# Patient Record
Sex: FEMALE | Race: ASIAN | NOT HISPANIC OR LATINO | Employment: STUDENT | URBAN - METROPOLITAN AREA
[De-identification: names, ages, dates, MRNs, and addresses within clinical notes are randomized per-mention and may not be internally consistent; named-entity substitution may affect disease eponyms.]

---

## 2025-01-29 ENCOUNTER — INITIAL PRENATAL (OUTPATIENT)
Dept: OBSTETRICS AND GYNECOLOGY | Facility: CLINIC | Age: 35
End: 2025-01-29
Payer: COMMERCIAL

## 2025-01-29 VITALS
WEIGHT: 157 LBS | HEIGHT: 63 IN | BODY MASS INDEX: 27.82 KG/M2 | DIASTOLIC BLOOD PRESSURE: 79 MMHG | SYSTOLIC BLOOD PRESSURE: 124 MMHG

## 2025-01-29 DIAGNOSIS — O26.891 PREGNANCY HEADACHE IN FIRST TRIMESTER (HHS-HCC): Primary | ICD-10-CM

## 2025-01-29 DIAGNOSIS — R51.9 PREGNANCY HEADACHE IN FIRST TRIMESTER (HHS-HCC): Primary | ICD-10-CM

## 2025-01-29 DIAGNOSIS — Z34.91 PRENATAL CARE IN FIRST TRIMESTER (HHS-HCC): ICD-10-CM

## 2025-01-29 DIAGNOSIS — O09.299 HISTORY OF MATERNAL FOURTH DEGREE PERINEAL LACERATION, CURRENTLY PREGNANT (HHS-HCC): ICD-10-CM

## 2025-01-29 DIAGNOSIS — O09.521 MULTIGRAVIDA OF ADVANCED MATERNAL AGE IN FIRST TRIMESTER (HHS-HCC): ICD-10-CM

## 2025-01-29 PROCEDURE — 0500F INITIAL PRENATAL CARE VISIT: CPT | Performed by: OBSTETRICS & GYNECOLOGY

## 2025-01-29 RX ORDER — METOCLOPRAMIDE 10 MG/1
10 TABLET ORAL EVERY 6 HOURS PRN
Qty: 1 TABLET | Refills: 2 | Status: SHIPPED | OUTPATIENT
Start: 2025-01-29 | End: 2025-03-30

## 2025-01-29 ASSESSMENT — EDINBURGH POSTNATAL DEPRESSION SCALE (EPDS)
I HAVE FELT SAD OR MISERABLE: NOT VERY OFTEN
I HAVE LOOKED FORWARD WITH ENJOYMENT TO THINGS: AS MUCH AS I EVER DID
I HAVE FELT SCARED OR PANICKY FOR NO GOOD REASON: NO, NOT MUCH
I HAVE BLAMED MYSELF UNNECESSARILY WHEN THINGS WENT WRONG: NOT VERY OFTEN
THINGS HAVE BEEN GETTING ON TOP OF ME: NO, MOST OF THE TIME I HAVE COPED QUITE WELL
TOTAL SCORE: 9
I HAVE BEEN SO UNHAPPY THAT I HAVE HAD DIFFICULTY SLEEPING: NOT VERY OFTEN
THE THOUGHT OF HARMING MYSELF HAS OCCURRED TO ME: HARDLY EVER
I HAVE BEEN ANXIOUS OR WORRIED FOR NO GOOD REASON: YES, SOMETIMES
I HAVE BEEN SO UNHAPPY THAT I HAVE BEEN CRYING: ONLY OCCASIONALLY
I HAVE BEEN ABLE TO LAUGH AND SEE THE FUNNY SIDE OF THINGS: AS MUCH AS I ALWAYS COULD

## 2025-01-29 NOTE — PROGRESS NOTES
Subjective   Patient ID [id number]   Gloria Browne is a 34 y.o.  at 4w6d with a working estimated date of delivery of 10/2/2025, by Last Menstrual Period who presents for an initial prenatal visit. No bleeding or pain, Has h/o headaches takes aleve wants to know what she can use in pregnancy.  No bleeding or pain.  Has had flu vaccine and updated covid vaccine.    Her pregnancy is notable for:  AMA  Prior VAVD, 4th degree  Vegetarian      OB History    Para Term  AB Living   2 1 1     1   SAB IAB Ectopic Multiple Live Births           1      # Outcome Date GA Lbr Misael/2nd Weight Sex Type Anes PTL Lv   2 Current            1 Term 21 39w0d  2.948 kg F Vag-Vacuum   PHYLICIA     [place]  Depression Scale Total: 9    Objective   Physical Exam  Weight: 71.2 kg (157 lb)  Expected Total Weight Gain: 7 kg (15 lb)-11.5 kg (25 lb)   Pregravid BMI: 27.82  BP: 124/79          OBGyn Exam    Assessment/Plan     lGoria Browne is a 34 y.o.  at 4w6d with a working estimated date of delivery of 10/2/2025, by Last Menstrual Period who presents for an initial prenatal visit.     Gestational sac 4+ week noted in uterus on TVUS.  Recommended daily magnesium, will call in reglan for headaches prn.  Pt states tylenol does not help.  Ok to take unisom for sleep/nausea.  Will start ASA next visit.  Has urinary incontinence, did try pelvic floor therapy, discussed surgical management would wait until after delivery.      Problem List Items Addressed This Visit    None  Visit Diagnoses       Prenatal care in first trimester ([facility])        Relevant Orders    C. trachomatis / N. gonorrhoeae, Amplified, Urogenital    Urine Culture    US MAC OB imaging order            RTC in 2 weeks viability scan.  Folder given  Sary Robert MD

## 2025-02-01 LAB
BACTERIA UR CULT: NORMAL
C TRACH RRNA SPEC QL NAA+PROBE: NOT DETECTED
N GONORRHOEA RRNA SPEC QL NAA+PROBE: NOT DETECTED
QUEST GC CT AMPLIFIED (ALWAYS MESSAGE): NORMAL

## 2025-02-12 ENCOUNTER — LAB (OUTPATIENT)
Dept: LAB | Facility: HOSPITAL | Age: 35
End: 2025-02-12
Payer: COMMERCIAL

## 2025-02-12 ENCOUNTER — APPOINTMENT (OUTPATIENT)
Dept: OBSTETRICS AND GYNECOLOGY | Facility: CLINIC | Age: 35
End: 2025-02-12
Payer: COMMERCIAL

## 2025-02-12 VITALS — WEIGHT: 157.5 LBS | DIASTOLIC BLOOD PRESSURE: 80 MMHG | BODY MASS INDEX: 27.9 KG/M2 | SYSTOLIC BLOOD PRESSURE: 134 MMHG

## 2025-02-12 DIAGNOSIS — O09.91 SUPERVISION OF HIGH RISK PREGNANCY, UNSPECIFIED, FIRST TRIMESTER (HHS-HCC): Primary | ICD-10-CM

## 2025-02-12 DIAGNOSIS — O26.21 RECURRENT PREGNANCY LOSS IN PREGNANT PATIENT IN FIRST TRIMESTER, ANTEPARTUM (HHS-HCC): ICD-10-CM

## 2025-02-12 DIAGNOSIS — O26.891 PREGNANCY HEADACHE IN FIRST TRIMESTER (HHS-HCC): ICD-10-CM

## 2025-02-12 DIAGNOSIS — R51.9 PREGNANCY HEADACHE IN FIRST TRIMESTER (HHS-HCC): ICD-10-CM

## 2025-02-12 PROBLEM — Z3A.01 6 WEEKS GESTATION OF PREGNANCY (HHS-HCC): Status: ACTIVE | Noted: 2025-02-12

## 2025-02-12 LAB
ABO GROUP (TYPE) IN BLOOD: NORMAL
ANTIBODY SCREEN: NORMAL
ERYTHROCYTE [DISTWIDTH] IN BLOOD BY AUTOMATED COUNT: 12.6 % (ref 11.5–14.5)
FERRITIN SERPL-MCNC: 87 NG/ML
HCT VFR BLD AUTO: 38.7 % (ref 36–46)
HGB BLD-MCNC: 13.1 G/DL (ref 12–16)
MCH RBC QN AUTO: 27.8 PG (ref 26–34)
MCHC RBC AUTO-ENTMCNC: 33.9 G/DL (ref 32–36)
MCV RBC AUTO: 82 FL (ref 80–100)
NRBC BLD-RTO: 0 /100 WBCS (ref 0–0)
PLATELET # BLD AUTO: 302 X10*3/UL (ref 150–450)
RBC # BLD AUTO: 4.72 X10*6/UL (ref 4–5.2)
RH FACTOR (ANTIGEN D): NORMAL
WBC # BLD AUTO: 10 X10*3/UL (ref 4.4–11.3)

## 2025-02-12 PROCEDURE — 83020 HEMOGLOBIN ELECTROPHORESIS: CPT

## 2025-02-12 PROCEDURE — 83021 HEMOGLOBIN CHROMOTOGRAPHY: CPT

## 2025-02-12 PROCEDURE — 86901 BLOOD TYPING SEROLOGIC RH(D): CPT

## 2025-02-12 PROCEDURE — 86900 BLOOD TYPING SEROLOGIC ABO: CPT

## 2025-02-12 PROCEDURE — 85027 COMPLETE CBC AUTOMATED: CPT

## 2025-02-12 PROCEDURE — 0501F PRENATAL FLOW SHEET: CPT | Performed by: OBSTETRICS & GYNECOLOGY

## 2025-02-12 PROCEDURE — 86850 RBC ANTIBODY SCREEN: CPT

## 2025-02-12 PROCEDURE — 82728 ASSAY OF FERRITIN: CPT

## 2025-02-12 NOTE — PROGRESS NOTES
Ob Visit  25     SUBJECTIVE      HPI: Gloria Browne is a 34 y.o.  at 6w6d here for RPNV.  No bleeding or pain.  Feels very fatigued.  Also has been having headaches not relieved with tylenol, reglan, bendaryl, magnesium.  Had this prior to pregnancy and used naprosyn which she now she cannot take it pregnancy.  Has never had neuro workup     OBJECTIVE  Visit Vitals  /80   Wt 71.4 kg (157 lb 8 oz)   LMP 2024   BMI 27.90 kg/m²   OB Status Pregnant   Smoking Status Never   BSA 1.78 m²            ASSESSMENT & PLAN    Gloria Browne is a 34 y.o.  at 6w6d here for the following concerns we addressed today:    Problem List Items Addressed This Visit       Pregnancy headache in first trimester ([facility]HS-HCC)    Relevant Orders    Referral to Neurology    Referral to [facility]     Other Visit Diagnoses       Supervision of high risk pregnancy, unspecified, first trimester ([facility]-HCC)    -  Primary    Relevant Orders    CBC Anemia Panel With Reflex,Pregnancy    Ferritin, Pregnancy (Completed)    Hemoglobin A1C    Hemoglobin Identification with Path Review    Hepatitis B Surface Antigen    Hepatitis C Antibody    HIV 1/2 Antigen/Antibody Screen with Reflex to Confirmation    Rubella Antibody, IgG    Syphilis Screen with Reflex    TSH with reflex to Free T4 if abnormal    Vitamin D 25-Hydroxy,Total (for eval of Vitamin D levels)    Type And Screen Is this order related to pregnancy or an upcoming surgery? Yes; [place] [facility] [place] the date of the surgery? 3/10/2025; Has this patient ever had a transfusion? No; Has th...    Recurrent pregnancy loss in pregnant patient in first trimester, antepartum ([facility]HS-HCC)                  RTC in 3 weeks      Sary Robert MD

## 2025-02-13 LAB
HEMOGLOBIN A2: 3 % (ref 2–3.5)
HEMOGLOBIN A: 96.6 % (ref 95.8–98)
HEMOGLOBIN F: 0.4 % (ref 0–2)
HEMOGLOBIN IDENTIFICATION INTERPRETATION: NORMAL
PATH REVIEW-HGB IDENTIFICATION: NORMAL
REFLEX ADDED, ANEMIA PANEL: NORMAL

## 2025-02-14 LAB
25(OH)D3+25(OH)D2 SERPL-MCNC: 23 NG/ML (ref 30–100)
EST. AVERAGE GLUCOSE BLD GHB EST-MCNC: 114 MG/DL
EST. AVERAGE GLUCOSE BLD GHB EST-SCNC: 6.3 MMOL/L
HBA1C MFR BLD: 5.6 % OF TOTAL HGB
HBV SURFACE AG SERPL QL IA: NORMAL
HCV AB SERPL QL IA: NORMAL
HIV 1+2 AB+HIV1 P24 AG SERPL QL IA: NORMAL
RUBV IGG SERPL IA-ACNC: 5.68 INDEX
T PALLIDUM AB SER QL IA: NORMAL
TSH SERPL-ACNC: 2.14 MIU/L

## 2025-02-17 LAB
25(OH)D3+25(OH)D2 SERPL-MCNC: 23 NG/ML (ref 30–100)
EST. AVERAGE GLUCOSE BLD GHB EST-MCNC: 114 MG/DL
EST. AVERAGE GLUCOSE BLD GHB EST-SCNC: 6.3 MMOL/L
HBA1C MFR BLD: 5.6 % OF TOTAL HGB
HBV SURFACE AG SERPL QL IA: NORMAL
HCV AB SERPL QL IA: NORMAL
HIV 1+2 AB+HIV1 P24 AG SERPL QL IA: NORMAL
RUBV IGG SERPL IA-ACNC: 5.68 INDEX
T PALLIDUM AB SER QL IA: NEGATIVE
TSH SERPL-ACNC: 2.14 MIU/L

## 2025-03-05 ENCOUNTER — APPOINTMENT (OUTPATIENT)
Dept: OBSTETRICS AND GYNECOLOGY | Facility: CLINIC | Age: 35
End: 2025-03-05
Payer: COMMERCIAL

## 2025-03-05 VITALS — BODY MASS INDEX: 27.78 KG/M2 | DIASTOLIC BLOOD PRESSURE: 70 MMHG | SYSTOLIC BLOOD PRESSURE: 105 MMHG | WEIGHT: 156.8 LBS

## 2025-03-05 DIAGNOSIS — R51.9 PREGNANCY HEADACHE IN FIRST TRIMESTER (HHS-HCC): ICD-10-CM

## 2025-03-05 DIAGNOSIS — O26.891 PREGNANCY HEADACHE IN FIRST TRIMESTER (HHS-HCC): ICD-10-CM

## 2025-03-05 DIAGNOSIS — Z3A.09 9 WEEKS GESTATION OF PREGNANCY (HHS-HCC): Primary | ICD-10-CM

## 2025-03-05 DIAGNOSIS — Z34.91 PRENATAL CARE IN FIRST TRIMESTER (HHS-HCC): ICD-10-CM

## 2025-03-05 DIAGNOSIS — O09.299 HISTORY OF MATERNAL FOURTH DEGREE PERINEAL LACERATION, CURRENTLY PREGNANT (HHS-HCC): ICD-10-CM

## 2025-03-05 PROCEDURE — 0501F PRENATAL FLOW SHEET: CPT | Performed by: OBSTETRICS & GYNECOLOGY

## 2025-03-05 RX ORDER — ASPIRIN 81 MG/1
162 TABLET ORAL DAILY
Qty: 60 TABLET | Refills: 11 | Status: SHIPPED | OUTPATIENT
Start: 2025-03-05 | End: 2026-03-05

## 2025-03-05 NOTE — PROGRESS NOTES
Headaches not improving despite tylenol, magnesium, reglan, benadryl  Will try and move up neuro appointment  Declines genetics but will do FTAE  No vaginal bleeding, pain,  eating well    GEN:  A&O, NAD  HEENT:  head NC/AT, conjunctiva clear, no thyromegaly or goiter  ABD:  soft, NT/ND, no obvious masses  BREAST:  no masses or TTP, no skin lesions or nipple discharge  NEURO:  CN 2-12 grossly intact  MS:  normal gait  DERM:  no hirsutism or acne  PSYCH:  normal affect, non-anxious

## 2025-04-01 ENCOUNTER — HOSPITAL ENCOUNTER (OUTPATIENT)
Dept: RADIOLOGY | Facility: CLINIC | Age: 35
Discharge: HOME | End: 2025-04-01
Payer: COMMERCIAL

## 2025-04-01 DIAGNOSIS — Z34.91 PRENATAL CARE IN FIRST TRIMESTER: ICD-10-CM

## 2025-04-01 PROCEDURE — 76801 OB US < 14 WKS SINGLE FETUS: CPT

## 2025-04-01 PROCEDURE — 76813 OB US NUCHAL MEAS 1 GEST: CPT

## 2025-04-02 ENCOUNTER — APPOINTMENT (OUTPATIENT)
Dept: OBSTETRICS AND GYNECOLOGY | Facility: CLINIC | Age: 35
End: 2025-04-02
Payer: COMMERCIAL

## 2025-04-02 VITALS — BODY MASS INDEX: 27.1 KG/M2 | DIASTOLIC BLOOD PRESSURE: 77 MMHG | WEIGHT: 153 LBS | SYSTOLIC BLOOD PRESSURE: 121 MMHG

## 2025-04-02 DIAGNOSIS — O26.891 PREGNANCY HEADACHE IN FIRST TRIMESTER (HHS-HCC): ICD-10-CM

## 2025-04-02 DIAGNOSIS — M54.9 BACK PAIN IN PREGNANCY (HHS-HCC): Primary | ICD-10-CM

## 2025-04-02 DIAGNOSIS — O99.891 BACK PAIN IN PREGNANCY (HHS-HCC): Primary | ICD-10-CM

## 2025-04-02 DIAGNOSIS — R51.9 PREGNANCY HEADACHE IN FIRST TRIMESTER (HHS-HCC): ICD-10-CM

## 2025-04-02 PROBLEM — Z3A.13 13 WEEKS GESTATION OF PREGNANCY (HHS-HCC): Status: ACTIVE | Noted: 2025-02-12

## 2025-04-02 PROCEDURE — 0501F PRENATAL FLOW SHEET: CPT | Performed by: OBSTETRICS & GYNECOLOGY

## 2025-04-02 RX ORDER — METOCLOPRAMIDE 10 MG/1
10 TABLET ORAL EVERY 6 HOURS PRN
Qty: 30 TABLET | Refills: 2 | Status: SHIPPED | OUTPATIENT
Start: 2025-04-02 | End: 2025-06-01

## 2025-04-02 RX ORDER — MELATONIN 10 MG
CAPSULE ORAL
COMMUNITY

## 2025-04-02 NOTE — PROGRESS NOTES
No bleeding or pain.  Headaches OK.  Increased nausea last two weeks no stomach pain.  Had  a lot of anxiety after taking ASA will try once more.  Discussed unisom/b6, reglan.  Also low back pain - referred to PT

## 2025-04-07 ENCOUNTER — TELEMEDICINE (OUTPATIENT)
Dept: NEUROLOGY | Facility: CLINIC | Age: 35
End: 2025-04-07
Payer: COMMERCIAL

## 2025-04-07 DIAGNOSIS — G43.909 EPISODIC MIGRAINE: Primary | ICD-10-CM

## 2025-04-07 PROCEDURE — 99204 OFFICE O/P NEW MOD 45 MIN: CPT | Performed by: NURSE PRACTITIONER

## 2025-04-07 RX ORDER — CYPROHEPTADINE HYDROCHLORIDE 4 MG/1
2 TABLET ORAL 2 TIMES DAILY PRN
Qty: 30 TABLET | Refills: 2 | Status: SHIPPED | OUTPATIENT
Start: 2025-04-07

## 2025-04-07 NOTE — PROGRESS NOTES
Virtual or Telephone Consent    An interactive audio and video telecommunication system which permits real time communications between the patient (at the originating site) and provider (at the distant site) was utilized to provide this telehealth service.   Verbal consent was requested and obtained from Gloria Browne on this date, 04/07/25 for a telehealth visit and the patient's location was confirmed at the time of the visit.    Patient being assessed today for initial evaluation of migraines.  She has a history of migraines and previously was taking naproxen which was effective for the episodes that she has 4-6 times per month.  They are typically located on the right parietal area and can last for the majority of the day.  Endorses nausea.  Endorses photophobia.  Due to her pregnancy, we will try her on cyproheptadine to see if that is an effective abortive medication for her.  Advised patient that she can take it up to 2 times per day as needed at the onset of the migraine.  Also advised of side effect of drowsiness possibility.  If the cyproheptadine is effective, we will continue that during the pregnancy and then follow-up afterwards if needed for ongoing treatment.  Discussed role of medicine, importance of taking medications, potential risks, benefits, and precautions to be taken.  Reviewed sleep hygiene and dietary modifications.    This note was created with voice recognition software and was not corrected for typographical or grammatical errors

## 2025-04-09 ENCOUNTER — APPOINTMENT (OUTPATIENT)
Dept: OBSTETRICS AND GYNECOLOGY | Facility: CLINIC | Age: 35
End: 2025-04-09
Payer: COMMERCIAL

## 2025-05-06 ENCOUNTER — APPOINTMENT (OUTPATIENT)
Dept: INTEGRATIVE MEDICINE | Facility: CLINIC | Age: 35
End: 2025-05-06
Payer: COMMERCIAL

## 2025-05-07 PROBLEM — Z3A.19 19 WEEKS GESTATION OF PREGNANCY (HHS-HCC): Status: ACTIVE | Noted: 2025-02-12

## 2025-05-08 ENCOUNTER — HOSPITAL ENCOUNTER (OUTPATIENT)
Dept: RADIOLOGY | Facility: CLINIC | Age: 35
Discharge: HOME | End: 2025-05-08
Payer: COMMERCIAL

## 2025-05-08 ENCOUNTER — APPOINTMENT (OUTPATIENT)
Dept: OBSTETRICS AND GYNECOLOGY | Facility: CLINIC | Age: 35
End: 2025-05-08
Payer: COMMERCIAL

## 2025-05-08 VITALS — SYSTOLIC BLOOD PRESSURE: 118 MMHG | BODY MASS INDEX: 27.24 KG/M2 | DIASTOLIC BLOOD PRESSURE: 60 MMHG | WEIGHT: 153.8 LBS

## 2025-05-08 DIAGNOSIS — Z34.91 PRENATAL CARE IN FIRST TRIMESTER: ICD-10-CM

## 2025-05-08 DIAGNOSIS — O09.521 MULTIGRAVIDA OF ADVANCED MATERNAL AGE IN FIRST TRIMESTER (HHS-HCC): Primary | ICD-10-CM

## 2025-05-08 DIAGNOSIS — O09.299 HISTORY OF MATERNAL FOURTH DEGREE PERINEAL LACERATION, CURRENTLY PREGNANT (HHS-HCC): ICD-10-CM

## 2025-05-08 DIAGNOSIS — Z3A.19 19 WEEKS GESTATION OF PREGNANCY (HHS-HCC): ICD-10-CM

## 2025-05-08 PROCEDURE — 76811 OB US DETAILED SNGL FETUS: CPT

## 2025-05-08 PROCEDURE — 0501F PRENATAL FLOW SHEET: CPT | Performed by: OBSTETRICS & GYNECOLOGY

## 2025-05-08 NOTE — PROGRESS NOTES
Saw neurology, tried periactin, did not help  No vaginal bleeding  Had anatomy scan today  Walking daily  FU 4 weeks

## 2025-06-04 PROBLEM — Z3A.22 22 WEEKS GESTATION OF PREGNANCY (HHS-HCC): Status: ACTIVE | Noted: 2025-02-12

## 2025-06-05 ENCOUNTER — APPOINTMENT (OUTPATIENT)
Dept: OBSTETRICS AND GYNECOLOGY | Facility: CLINIC | Age: 35
End: 2025-06-05
Payer: COMMERCIAL

## 2025-06-05 VITALS — BODY MASS INDEX: 27.28 KG/M2 | SYSTOLIC BLOOD PRESSURE: 117 MMHG | WEIGHT: 154 LBS | DIASTOLIC BLOOD PRESSURE: 71 MMHG

## 2025-06-05 DIAGNOSIS — O09.521 MULTIGRAVIDA OF ADVANCED MATERNAL AGE IN FIRST TRIMESTER (HHS-HCC): ICD-10-CM

## 2025-06-05 DIAGNOSIS — Z34.92 PRENATAL CARE IN SECOND TRIMESTER, UNSPECIFIED GRAVIDITY: ICD-10-CM

## 2025-06-05 DIAGNOSIS — Z3A.23 23 WEEKS GESTATION OF PREGNANCY (HHS-HCC): ICD-10-CM

## 2025-06-05 DIAGNOSIS — Z13.1 SCREENING FOR DIABETES MELLITUS (DM): Primary | ICD-10-CM

## 2025-06-05 DIAGNOSIS — K21.9 GASTROESOPHAGEAL REFLUX DISEASE WITHOUT ESOPHAGITIS: ICD-10-CM

## 2025-06-05 DIAGNOSIS — O09.299 HISTORY OF MATERNAL FOURTH DEGREE PERINEAL LACERATION, CURRENTLY PREGNANT (HHS-HCC): ICD-10-CM

## 2025-06-05 PROCEDURE — 0501F PRENATAL FLOW SHEET: CPT | Performed by: OBSTETRICS & GYNECOLOGY

## 2025-06-05 RX ORDER — OMEPRAZOLE 20 MG/1
20 CAPSULE, DELAYED RELEASE ORAL DAILY
Qty: 90 CAPSULE | Refills: 3 | Status: SHIPPED | OUTPATIENT
Start: 2025-06-05 | End: 2026-06-05

## 2025-06-05 NOTE — PROGRESS NOTES
Ob Visit  25     SUBJECTIVE      HPI: Gloria Browne is a 34 y.o.  at 23w0d here for RPNV. Does not endorse contractions, leakage of fluid or vaginal bleeding.  Endorses good fetal movement.  Pt has not gained weight but states she is exercising daily and eating well which she was not doing prior to pregnancy.  Does have some pulling in the rlq no nausea/vomiting.  Also requiring omeprazole for heartburn.     OBJECTIVE  Visit Vitals  /71   Wt 69.9 kg (154 lb)   LMP 2024   BMI 27.28 kg/m²   OB Status Pregnant   Smoking Status Never   BSA 1.76 m²            ASSESSMENT & PLAN    Gloria Browne is a 34 y.o.  at 23w0d here for the following concerns we addressed today:  Suspect RLQ msi - gave info for binder, fu sooner if getting worse  Problem List Items Addressed This Visit       History of maternal fourth degree perineal laceration, currently pregnant ([facility])    Multigravida of advanced maternal age in first trimester ([facility])    Overview   Declined genetics           23 weeks gestation of pregnancy ([facility])    Overview   Next visit:  Discuss delivery plan - h/o 4th degree  1 hr   tdap      Desired provider in labor: [] CNM  [x] Physician   [] Either Acceptable  [x] Blood Products: [x] Yes, accepts [] No, needs counseling  [x] Initial BMI: 27.82   [x] Prenatal Labs: normal  [x] Cervical Cancer Screening up to date  NILM HPV neg  [x] Rh status: POS  [x] Screen for IPV and Substance Use Risk:  [x] Genetic Screening (cfDNA):  declines  [x] First Trimester Anatomy Screen (11-13.6 wks): normal  [x] Baby ASA (initiated):   [x] Pregnancy dated by: LMP c/w first trimester US    [x] Anatomy US: (19-20 wks)normal placenta anterior no previa  [] Federal Sterilization consent signed (if indicated):  [] 1hr GCT at 24-28wks:n/v  [] Rhogam (if indicated):   [] Fetal Surveillance (if indicated):  [] Tdap (27-32 wks, may be given up to 36 wks if initial window missed):   [] RSV (32-36 wks)  (Sept. to end ):     [] Feeding Intentions:  [] Postpartum Birth control method:   [] GBS at 36 - 37 wks:  [] 39 weeks discussion of IOL vs. Expectant management:  [] Mode of delivery ( anticipated ):           Other Visit Diagnoses         Screening for diabetes mellitus (DM)    -  Primary      Prenatal care in second trimester, unspecified           Gastroesophageal reflux disease without esophagitis        Relevant Medications    omeprazole (PriLOSEC) 20 mg DR capsule              RTC in 4 weeks      Sary Robert MD

## 2025-06-17 ENCOUNTER — APPOINTMENT (OUTPATIENT)
Dept: NEUROLOGY | Facility: HOSPITAL | Age: 35
End: 2025-06-17
Payer: COMMERCIAL

## 2025-07-02 ENCOUNTER — APPOINTMENT (OUTPATIENT)
Dept: OBSTETRICS AND GYNECOLOGY | Facility: CLINIC | Age: 35
End: 2025-07-02
Payer: COMMERCIAL

## 2025-07-02 VITALS — BODY MASS INDEX: 27.46 KG/M2 | SYSTOLIC BLOOD PRESSURE: 105 MMHG | DIASTOLIC BLOOD PRESSURE: 73 MMHG | WEIGHT: 155 LBS

## 2025-07-02 DIAGNOSIS — M25.511 ACUTE PAIN OF RIGHT SHOULDER: ICD-10-CM

## 2025-07-02 DIAGNOSIS — Z3A.26 26 WEEKS GESTATION OF PREGNANCY (HHS-HCC): Primary | ICD-10-CM

## 2025-07-02 DIAGNOSIS — Z23 NEED FOR VACCINATION: ICD-10-CM

## 2025-07-02 DIAGNOSIS — O09.521 MULTIGRAVIDA OF ADVANCED MATERNAL AGE IN FIRST TRIMESTER (HHS-HCC): ICD-10-CM

## 2025-07-02 DIAGNOSIS — Z71.85 VACCINE COUNSELING: ICD-10-CM

## 2025-07-02 DIAGNOSIS — O09.299 HISTORY OF MATERNAL FOURTH DEGREE PERINEAL LACERATION, CURRENTLY PREGNANT (HHS-HCC): ICD-10-CM

## 2025-07-02 DIAGNOSIS — Z13.1 SCREENING FOR DIABETES MELLITUS (DM): ICD-10-CM

## 2025-07-02 DIAGNOSIS — Z34.92 PRENATAL CARE IN SECOND TRIMESTER, UNSPECIFIED GRAVIDITY: ICD-10-CM

## 2025-07-02 PROCEDURE — 82746 ASSAY OF FOLIC ACID SERUM: CPT

## 2025-07-02 PROCEDURE — 82728 ASSAY OF FERRITIN: CPT

## 2025-07-02 PROCEDURE — 85027 COMPLETE CBC AUTOMATED: CPT

## 2025-07-02 PROCEDURE — 83550 IRON BINDING TEST: CPT

## 2025-07-02 PROCEDURE — 90471 IMMUNIZATION ADMIN: CPT | Performed by: OBSTETRICS & GYNECOLOGY

## 2025-07-02 PROCEDURE — 0501F PRENATAL FLOW SHEET: CPT | Performed by: OBSTETRICS & GYNECOLOGY

## 2025-07-02 PROCEDURE — 90715 TDAP VACCINE 7 YRS/> IM: CPT | Performed by: OBSTETRICS & GYNECOLOGY

## 2025-07-02 PROCEDURE — 82607 VITAMIN B-12: CPT

## 2025-07-02 NOTE — ASSESSMENT & PLAN NOTE
Discussed recurrence risk of OASIS 5% and risks of fecal/flatus incontinence over time with OASIS.  Offered  delivery  Pt desires vaginal delivery

## 2025-07-02 NOTE — PROGRESS NOTES
Ob Visit  25     SUBJECTIVE      HPI: Gloria Browne is a 34 y.o.  at 26w6d here for RPNV.  Does not endorse contractions, leakage of fluid or vaginal bleeding.  Endorses good fetal movement. Some right shoulder pain, difficult to lift arm, comes and goes.  No numbness/tingling in arm     OBJECTIVE  Visit Vitals  /73   Wt 70.3 kg (155 lb)   LMP 2024   BMI 27.46 kg/m²   OB Status Pregnant   Smoking Status Never   BSA 1.77 m²            ASSESSMENT & PLAN    Gloria Browne is a 34 y.o.  at 26w6d here for the following concerns we addressed today:    Problem List Items Addressed This Visit       History of maternal fourth degree perineal laceration, currently pregnant ([facility])    Overview   Counseling complete plans vaginal delivery         Current Assessment & Plan   Discussed recurrence risk of OASIS 5% and risks of fecal/flatus incontinence over time with OASIS.  Offered  delivery  Pt desires vaginal delivery         Multigravida of advanced maternal age in first trimester ([facility])    Overview   Declined genetics           26 weeks gestation of pregnancy ([facility]) - Primary    Overview   Next visit  1 hr results  ?shoulder pain      Desired provider in labor: [] CNM  [x] Physician   [] Either Acceptable  [x] Blood Products: [x] Yes, accepts [] No, needs counseling  [x] Initial BMI: 27.82   [x] Prenatal Labs: normal  [x] Cervical Cancer Screening up to date  NILM HPV neg  [x] Rh status: POS  [x] Screen for IPV and Substance Use Risk:  [x] Genetic Screening (cfDNA):  declines  [x] First Trimester Anatomy Screen (11-13.6 wks): normal  [x] Baby ASA (initiated):   [x] Pregnancy dated by: LMP c/w first trimester US    [x] Anatomy US: (19-20 wks)normal placenta anterior no previa  [] Federal Sterilization consent signed (if indicated):  [] 1hr GCT at 24-28wks:  [] Rhogam (if indicated): not  [] Fetal Surveillance (if indicated):  [x] Tdap (27-32 wks, may be given up to 36  wks if initial window missed):   [] RSV (32-36 wks) (Sept. to end ):     [x] Feeding Intentions:greast  [] Postpartum Birth control method:   [] GBS at 36 - 37 wks:  [] 39 weeks discussion of IOL vs. Expectant management:  [x] Mode of delivery ( anticipated ): vaginal          Other Visit Diagnoses         Need for vaccination        Relevant Orders    Tdap vaccine, age 7 years and older  (BOOSTRIX) (Completed)      Vaccine counseling        Relevant Orders    Tdap vaccine, age 7 years and older  (BOOSTRIX) (Completed)      Prenatal care in second trimester, unspecified         Relevant Orders    Tdap vaccine, age 7 years and older  (BOOSTRIX) (Completed)    Glucose, 1 Hour Screen, Pregnancy    CBC Anemia Panel With Reflex,Pregnancy    Ferritin, Pregnancy      Screening for diabetes mellitus (DM)        Relevant Orders    Glucose, 1 Hour Screen, Pregnancy    CBC Anemia Panel With Reflex,Pregnancy    Ferritin, Pregnancy              RTC in 2 weeks      Sary Robert MD

## 2025-07-03 DIAGNOSIS — O99.810 ABNORMAL GLUCOSE TOLERANCE IN PREGNANCY (HHS-HCC): Primary | ICD-10-CM

## 2025-07-03 DIAGNOSIS — D50.8 IRON DEFICIENCY ANEMIA SECONDARY TO INADEQUATE DIETARY IRON INTAKE: ICD-10-CM

## 2025-07-03 PROBLEM — D64.9 NORMOCYTIC ANEMIA: Status: ACTIVE | Noted: 2025-07-03

## 2025-07-03 LAB
ERYTHROCYTE [DISTWIDTH] IN BLOOD BY AUTOMATED COUNT: 13.3 % (ref 11.5–14.5)
FERRITIN SERPL-MCNC: 35 NG/ML
FERRITIN SERPL-MCNC: 35 NG/ML
FOLATE SERPL-MCNC: 21 NG/ML
GLUCOSE 1H P 50 G GLC PO SERPL-MCNC: 163 MG/DL
HCT VFR BLD AUTO: 32.6 % (ref 36–46)
HGB BLD-MCNC: 10.3 G/DL (ref 12–16)
IRON SATN MFR SERPL: 19 %
IRON SERPL-MCNC: 79 UG/DL
MCH RBC QN AUTO: 28.5 PG (ref 26–34)
MCHC RBC AUTO-ENTMCNC: 31.6 G/DL (ref 32–36)
MCV RBC AUTO: 90 FL (ref 80–100)
NRBC BLD-RTO: 0 /100 WBCS (ref 0–0)
PLATELET # BLD AUTO: 260 X10*3/UL (ref 150–450)
RBC # BLD AUTO: 3.61 X10*6/UL (ref 4–5.2)
REFLEX ADDED, ANEMIA PANEL: NORMAL
TIBC SERPL-MCNC: 418 UG/DL
UIBC SERPL-MCNC: 339 UG/DL
VIT B12 SERPL-MCNC: 349 PG/ML
WBC # BLD AUTO: 10.7 X10*3/UL (ref 4.4–11.3)

## 2025-07-03 RX ORDER — FERROUS SULFATE 325(65) MG
325 TABLET, DELAYED RELEASE (ENTERIC COATED) ORAL EVERY 24 HOURS
Qty: 30 TABLET | Refills: 11 | Status: SHIPPED | OUTPATIENT
Start: 2025-07-03 | End: 2026-07-03

## 2025-07-11 LAB
GLUCOSE 1H P CHAL SERPL-MCNC: 207 MG/DL
GLUCOSE 2H P CHAL SERPL-MCNC: 125 MG/DL
GLUCOSE 3H P 100 G GLC PO SERPL-MCNC: 72 MG/DL
GLUCOSE P FAST SERPL-MCNC: 72 MG/DL (ref 65–94)
SERVICE CMNT-IMP: ABNORMAL

## 2025-07-16 ENCOUNTER — APPOINTMENT (OUTPATIENT)
Dept: OBSTETRICS AND GYNECOLOGY | Facility: CLINIC | Age: 35
End: 2025-07-16
Payer: COMMERCIAL

## 2025-07-16 VITALS — BODY MASS INDEX: 27.46 KG/M2 | WEIGHT: 155 LBS | DIASTOLIC BLOOD PRESSURE: 71 MMHG | SYSTOLIC BLOOD PRESSURE: 110 MMHG

## 2025-07-16 DIAGNOSIS — O09.521 MULTIGRAVIDA OF ADVANCED MATERNAL AGE IN FIRST TRIMESTER (HHS-HCC): ICD-10-CM

## 2025-07-16 DIAGNOSIS — Z3A.28 28 WEEKS GESTATION OF PREGNANCY (HHS-HCC): ICD-10-CM

## 2025-07-16 DIAGNOSIS — O09.299 HISTORY OF MATERNAL FOURTH DEGREE PERINEAL LACERATION, CURRENTLY PREGNANT (HHS-HCC): ICD-10-CM

## 2025-07-16 DIAGNOSIS — D64.9 NORMOCYTIC ANEMIA: Primary | ICD-10-CM

## 2025-07-16 PROCEDURE — 0501F PRENATAL FLOW SHEET: CPT | Performed by: OBSTETRICS & GYNECOLOGY

## 2025-07-16 NOTE — PROGRESS NOTES
Ob Visit  25     SUBJECTIVE      HPI: Gloria Browne is a 34 y.o.  at 28w6d here for RPNV. Does not endorse contractions, leakage of fluid or vaginal bleeding.  Endorses good fetal movement.        OBJECTIVE  Visit Vitals  /71   Wt 70.3 kg (155 lb)   LMP 2024   BMI 27.46 kg/m²   OB Status Pregnant   Smoking Status Never   BSA 1.77 m²            ASSESSMENT & PLAN  Discussed IOL 39 wk versus expectant management, pt will consider  Gloria Browne is a 34 y.o.  at 28w6d here for the following concerns we addressed today:    Problem List Items Addressed This Visit       History of maternal fourth degree perineal laceration, currently pregnant ([facility])    Overview   Counseling complete plans vaginal delivery         Multigravida of advanced maternal age in first trimester ([facility])    Overview   Declined genetics           28 weeks gestation of pregnancy ([facility])    Overview   Next visit  ?iol      Desired provider in labor: [] CNM  [x] Physician   [] Either Acceptable  [x] Blood Products: [x] Yes, accepts [] No, needs counseling  [x] Initial BMI: 27.82   [x] Prenatal Labs: normal  [x] Cervical Cancer Screening up to date  NILM HPV neg  [x] Rh status: POS  [x] Screen for IPV and Substance Use Risk:  [x] Genetic Screening (cfDNA):  declines  [x] First Trimester Anatomy Screen (11-13.6 wks): normal  [x] Baby ASA (initiated):   [x] Pregnancy dated by: LMP c/w first trimester US    [x] Anatomy US: (19-20 wks)normal placenta anterior no previa  [] Federal Sterilization consent signed (if indicated):  [x] 1hr GCT at 24-28wks: 162, 3 hr one abnormal  [x] Rhogam (if indicated): not  [] Fetal Surveillance (if indicated):  [x] Tdap (27-32 wks, may be given up to 36 wks if initial window missed):   [] RSV (32-36 wks) (Sept. to end of ):     [x] Feeding Intentions:greast  [] Postpartum Birth control method:   [] GBS at 36 - 37 wks:  [] 39 weeks discussion of IOL vs. Expectant  management:  [x] Mode of delivery ( anticipated ): vaginal         Normocytic anemia - Primary    Overview   Hb 10.3 MCV 90, ferritin 35, b12 349  Will add iron 2x/weekly  Repeat in 6 weeks              RTC in 2 weeks      Sary Robert MD

## 2025-07-24 ENCOUNTER — HOSPITAL ENCOUNTER (OUTPATIENT)
Dept: RADIOLOGY | Facility: CLINIC | Age: 35
Discharge: HOME | End: 2025-07-24
Payer: COMMERCIAL

## 2025-07-24 DIAGNOSIS — O36.5930 MATERNAL CARE FOR OTHER KNOWN OR SUSPECTED POOR FETAL GROWTH, THIRD TRIMESTER, NOT APPLICABLE OR UNSPECIFIED: ICD-10-CM

## 2025-07-24 DIAGNOSIS — Z34.91 PRENATAL CARE IN FIRST TRIMESTER: ICD-10-CM

## 2025-07-24 PROCEDURE — 76816 OB US FOLLOW-UP PER FETUS: CPT

## 2025-07-24 PROCEDURE — 76816 OB US FOLLOW-UP PER FETUS: CPT | Performed by: STUDENT IN AN ORGANIZED HEALTH CARE EDUCATION/TRAINING PROGRAM

## 2025-07-31 ENCOUNTER — PREP FOR PROCEDURE (OUTPATIENT)
Dept: OBSTETRICS AND GYNECOLOGY | Facility: CLINIC | Age: 35
End: 2025-07-31

## 2025-07-31 ENCOUNTER — APPOINTMENT (OUTPATIENT)
Dept: OBSTETRICS AND GYNECOLOGY | Facility: CLINIC | Age: 35
End: 2025-07-31
Payer: COMMERCIAL

## 2025-07-31 VITALS — BODY MASS INDEX: 27.46 KG/M2 | SYSTOLIC BLOOD PRESSURE: 109 MMHG | DIASTOLIC BLOOD PRESSURE: 72 MMHG | WEIGHT: 155 LBS

## 2025-07-31 DIAGNOSIS — Z3A.31 31 WEEKS GESTATION OF PREGNANCY (HHS-HCC): ICD-10-CM

## 2025-07-31 DIAGNOSIS — D64.9 NORMOCYTIC ANEMIA: ICD-10-CM

## 2025-07-31 DIAGNOSIS — O09.299 HISTORY OF MATERNAL FOURTH DEGREE PERINEAL LACERATION, CURRENTLY PREGNANT (HHS-HCC): ICD-10-CM

## 2025-07-31 DIAGNOSIS — O09.521 MULTIGRAVIDA OF ADVANCED MATERNAL AGE IN FIRST TRIMESTER (HHS-HCC): Primary | ICD-10-CM

## 2025-07-31 DIAGNOSIS — Z3A.39 39 WEEKS GESTATION OF PREGNANCY (HHS-HCC): Primary | ICD-10-CM

## 2025-07-31 PROCEDURE — 0501F PRENATAL FLOW SHEET: CPT | Performed by: OBSTETRICS & GYNECOLOGY

## 2025-07-31 NOTE — PROGRESS NOTES
Ob Visit  25     SUBJECTIVE      HPI: Gloria Browne is a 34 y.o.  at 31w0d here for RPNV.  Does not endorse contractions, leakage of fluid or vaginal bleeding.  Endorses good fetal movement.      OBJECTIVE  Visit Vitals  /72   Wt 70.3 kg (155 lb)   LMP 2024   BMI 27.46 kg/m²   OB Status Pregnant   Smoking Status Never   BSA 1.77 m²            ASSESSMENT & PLAN    Gloria Browne is a 34 y.o.  at 31w0d here for the following concerns we addressed today:    Problem List Items Addressed This Visit       History of maternal fourth degree perineal laceration, currently pregnant ([facility])    Overview   Counseling complete plans vaginal delivery         Multigravida of advanced maternal age in first trimester ([facility]) - Primary    Overview   Declined genetics           31 weeks gestation of pregnancy ([facility])    Overview   IOL  39 weeks MB 8 am  Anemia labs n/v    Desired provider in labor: [] CNM  [x] Physician   [] Either Acceptable  [x] Blood Products: [x] Yes, accepts [] No, needs counseling  [x] Initial BMI: 27.82   [x] Prenatal Labs: normal  [x] Cervical Cancer Screening up to date  NILM HPV neg  [x] Rh status: POS  [x] Screen for IPV and Substance Use Risk:  [x] Genetic Screening (cfDNA):  declines  [x] First Trimester Anatomy Screen (11-13.6 wks): normal  [x] Baby ASA (initiated):   [x] Pregnancy dated by: LMP c/w first trimester US    [x] Anatomy US: (19-20 wks)normal placenta anterior no previa  [] Federal Sterilization consent signed (if indicated):  [x] 1hr GCT at 24-28wks: 162, 3 hr one abnormal  [x] Rhogam (if indicated): not  [] Fetal Surveillance (if indicated):  [x] Tdap (27-32 wks, may be given up to 36 wks if initial window missed):   [] RSV (32-36 wks) (Sept. to end of ):     [x] Feeding Intentions:breast  [] Postpartum Birth control method:   [] GBS at 36 - 37 wks:  [] 39 weeks discussion of IOL vs. Expectant management:  [x] Mode of delivery (  anticipated ): vaginal         Normocytic anemia    Overview   Hb 10.3 MCV 90, ferritin 35, b12 349  Will add iron 2x/weekly  Repeat in 6 weeks              RTC in 1 weeks  Plans 39 wk IOL since h/o 4th degree       Sary Robert MD

## 2025-08-06 ENCOUNTER — APPOINTMENT (OUTPATIENT)
Dept: OBSTETRICS AND GYNECOLOGY | Facility: CLINIC | Age: 35
End: 2025-08-06
Payer: COMMERCIAL

## 2025-08-06 VITALS — BODY MASS INDEX: 27.46 KG/M2 | WEIGHT: 155 LBS | DIASTOLIC BLOOD PRESSURE: 75 MMHG | SYSTOLIC BLOOD PRESSURE: 111 MMHG

## 2025-08-06 DIAGNOSIS — M25.511 ACUTE PAIN OF RIGHT SHOULDER: ICD-10-CM

## 2025-08-06 DIAGNOSIS — D64.9 NORMOCYTIC ANEMIA: ICD-10-CM

## 2025-08-06 DIAGNOSIS — O09.521 MULTIGRAVIDA OF ADVANCED MATERNAL AGE IN FIRST TRIMESTER (HHS-HCC): ICD-10-CM

## 2025-08-06 DIAGNOSIS — Z3A.31 31 WEEKS GESTATION OF PREGNANCY (HHS-HCC): Primary | ICD-10-CM

## 2025-08-06 DIAGNOSIS — O09.299 HISTORY OF MATERNAL FOURTH DEGREE PERINEAL LACERATION, CURRENTLY PREGNANT (HHS-HCC): ICD-10-CM

## 2025-08-06 PROCEDURE — 0501F PRENATAL FLOW SHEET: CPT | Performed by: OBSTETRICS & GYNECOLOGY

## 2025-08-06 NOTE — PROGRESS NOTES
Ob Visit  25     SUBJECTIVE      HPI: Gloria Browne is a 34 y.o.  at 31w6d here for RPNV. Does not endorse contractions, leakage of fluid or vaginal bleeding.  Endorses good fetal movement.      OBJECTIVE  Visit Vitals  /75   Wt 70.3 kg (155 lb)   LMP 2024   BMI 27.46 kg/m²   OB Status Pregnant   Smoking Status Never   BSA 1.77 m²            ASSESSMENT & PLAN    Gloria Browne is a 34 y.o.  at 31w6d here for the following concerns we addressed today:    Problem List Items Addressed This Visit       History of maternal fourth degree perineal laceration, currently pregnant ([facility])    Overview   Counseling complete plans vaginal delivery         Multigravida of advanced maternal age in first trimester ([facility])    Overview   Declined genetics           31 weeks gestation of pregnancy ([facility]) - Primary    Overview   IOL  39 weeks MB 8 am  Anemia labs n/v    Desired provider in labor: [] CNM  [x] Physician   [] Either Acceptable  [x] Blood Products: [x] Yes, accepts [] No, needs counseling  [x] Initial BMI: 27.82   [x] Prenatal Labs: normal  [x] Cervical Cancer Screening up to date  NILM HPV neg  [x] Rh status: POS  [x] Screen for IPV and Substance Use Risk:  [x] Genetic Screening (cfDNA):  declines  [x] First Trimester Anatomy Screen (11-13.6 wks): normal  [x] Baby ASA (initiated):   [x] Pregnancy dated by: LMP c/w first trimester US    [x] Anatomy US: (19-20 wks)normal placenta anterior no previa  [] Federal Sterilization consent signed (if indicated):  [x] 1hr GCT at 24-28wks: 162, 3 hr one abnormal  [x] Rhogam (if indicated): not  [] Fetal Surveillance (if indicated):  [x] Tdap (27-32 wks, may be given up to 36 wks if initial window missed):   [] RSV (32-36 wks) (Sept. to end of ):     [x] Feeding Intentions:breast  [] Postpartum Birth control method:   [] GBS at 36 - 37 wks:  [] 39 weeks discussion of IOL vs. Expectant management:  [x] Mode of delivery (  anticipated ): vaginal         Acute pain of right shoulder    Overview   Offered PT/ortho  Declines for now         Normocytic anemia    Overview   Hb 10.3 MCV 90, ferritin 35, b12 349  Will add iron 2x/weekly  Repeat in 6 weeks              RTC in 2 weeks, anemia labs      Sary Robert MD

## 2025-08-13 ENCOUNTER — APPOINTMENT (OUTPATIENT)
Dept: OBSTETRICS AND GYNECOLOGY | Facility: CLINIC | Age: 35
End: 2025-08-13
Payer: COMMERCIAL

## 2025-08-26 PROBLEM — Z3A.34 34 WEEKS GESTATION OF PREGNANCY (HHS-HCC): Status: ACTIVE | Noted: 2025-02-12

## 2025-08-27 ENCOUNTER — APPOINTMENT (OUTPATIENT)
Dept: OBSTETRICS AND GYNECOLOGY | Facility: CLINIC | Age: 35
End: 2025-08-27
Payer: COMMERCIAL

## 2025-08-27 VITALS — WEIGHT: 155 LBS | BODY MASS INDEX: 27.46 KG/M2 | SYSTOLIC BLOOD PRESSURE: 113 MMHG | DIASTOLIC BLOOD PRESSURE: 75 MMHG

## 2025-08-27 DIAGNOSIS — O09.299 HISTORY OF MATERNAL FOURTH DEGREE PERINEAL LACERATION, CURRENTLY PREGNANT (HHS-HCC): ICD-10-CM

## 2025-08-27 DIAGNOSIS — D64.9 NORMOCYTIC ANEMIA: ICD-10-CM

## 2025-08-27 DIAGNOSIS — Z34.93 PRENATAL CARE IN THIRD TRIMESTER, UNSPECIFIED GRAVIDITY: ICD-10-CM

## 2025-08-27 DIAGNOSIS — M25.511 ACUTE PAIN OF RIGHT SHOULDER: ICD-10-CM

## 2025-08-27 DIAGNOSIS — O09.521 MULTIGRAVIDA OF ADVANCED MATERNAL AGE IN FIRST TRIMESTER (HHS-HCC): ICD-10-CM

## 2025-08-27 DIAGNOSIS — O99.013 ANEMIA AFFECTING PREGNANCY IN THIRD TRIMESTER: ICD-10-CM

## 2025-08-27 DIAGNOSIS — Z3A.34 34 WEEKS GESTATION OF PREGNANCY (HHS-HCC): Primary | ICD-10-CM

## 2025-08-27 PROCEDURE — 85027 COMPLETE CBC AUTOMATED: CPT

## 2025-08-27 PROCEDURE — 0501F PRENATAL FLOW SHEET: CPT | Performed by: OBSTETRICS & GYNECOLOGY

## 2025-08-28 LAB
ERYTHROCYTE [DISTWIDTH] IN BLOOD BY AUTOMATED COUNT: 13.2 % (ref 11.5–14.5)
FERRITIN SERPL-MCNC: 23 NG/ML
HCT VFR BLD AUTO: 36.1 % (ref 36–46)
HGB BLD-MCNC: 11.4 G/DL (ref 12–16)
MCH RBC QN AUTO: 27.5 PG (ref 26–34)
MCHC RBC AUTO-ENTMCNC: 31.6 G/DL (ref 32–36)
MCV RBC AUTO: 87 FL (ref 80–100)
NRBC BLD-RTO: 0 /100 WBCS (ref 0–0)
PLATELET # BLD AUTO: 237 X10*3/UL (ref 150–450)
RBC # BLD AUTO: 4.14 X10*6/UL (ref 4–5.2)
REFLEX ADDED, ANEMIA PANEL: NORMAL
RETICS #: NORMAL CELLS/UL (ref 20000–80000)
RETICS/RBC NFR AUTO: 1.8 %
WBC # BLD AUTO: 10.1 X10*3/UL (ref 4.4–11.3)

## 2025-09-04 ENCOUNTER — HOSPITAL ENCOUNTER (OUTPATIENT)
Dept: RADIOLOGY | Facility: CLINIC | Age: 35
Discharge: HOME | End: 2025-09-04
Payer: COMMERCIAL

## 2025-09-04 DIAGNOSIS — Z34.91 PRENATAL CARE IN FIRST TRIMESTER: ICD-10-CM

## 2025-09-04 PROCEDURE — 76816 OB US FOLLOW-UP PER FETUS: CPT

## 2025-09-04 PROCEDURE — 76816 OB US FOLLOW-UP PER FETUS: CPT | Performed by: OBSTETRICS & GYNECOLOGY

## 2025-09-11 ENCOUNTER — APPOINTMENT (OUTPATIENT)
Dept: OBSTETRICS AND GYNECOLOGY | Facility: CLINIC | Age: 35
End: 2025-09-11
Payer: COMMERCIAL

## 2025-09-18 ENCOUNTER — APPOINTMENT (OUTPATIENT)
Dept: OBSTETRICS AND GYNECOLOGY | Facility: CLINIC | Age: 35
End: 2025-09-18
Payer: COMMERCIAL

## 2025-09-24 ENCOUNTER — APPOINTMENT (OUTPATIENT)
Dept: OBSTETRICS AND GYNECOLOGY | Facility: CLINIC | Age: 35
End: 2025-09-24
Payer: COMMERCIAL

## 2025-10-01 ENCOUNTER — APPOINTMENT (OUTPATIENT)
Dept: OBSTETRICS AND GYNECOLOGY | Facility: CLINIC | Age: 35
End: 2025-10-01
Payer: COMMERCIAL

## 2025-10-09 ENCOUNTER — APPOINTMENT (OUTPATIENT)
Dept: OBSTETRICS AND GYNECOLOGY | Facility: CLINIC | Age: 35
End: 2025-10-09
Payer: COMMERCIAL